# Patient Record
Sex: MALE | Race: WHITE | Employment: OTHER | URBAN - METROPOLITAN AREA
[De-identification: names, ages, dates, MRNs, and addresses within clinical notes are randomized per-mention and may not be internally consistent; named-entity substitution may affect disease eponyms.]

---

## 2019-04-29 ENCOUNTER — APPOINTMENT (OUTPATIENT)
Dept: ULTRASOUND IMAGING | Age: 54
End: 2019-04-29
Payer: COMMERCIAL

## 2019-04-29 ENCOUNTER — APPOINTMENT (OUTPATIENT)
Dept: CT IMAGING | Age: 54
End: 2019-04-29
Payer: COMMERCIAL

## 2019-04-29 ENCOUNTER — HOSPITAL ENCOUNTER (EMERGENCY)
Age: 54
Discharge: HOME | End: 2019-04-29
Attending: EMERGENCY MEDICINE
Payer: COMMERCIAL

## 2019-04-29 VITALS
OXYGEN SATURATION: 98 % | DIASTOLIC BLOOD PRESSURE: 86 MMHG | WEIGHT: 196 LBS | TEMPERATURE: 98.3 F | HEART RATE: 78 BPM | HEIGHT: 72 IN | BODY MASS INDEX: 26.55 KG/M2 | RESPIRATION RATE: 14 BRPM | SYSTOLIC BLOOD PRESSURE: 116 MMHG

## 2019-04-29 DIAGNOSIS — K57.32 DIVERTICULITIS OF COLON: Primary | ICD-10-CM

## 2019-04-29 LAB
ANION GAP SERPL CALCULATED.3IONS-SCNC: 9 MMOL/L (ref 7–16)
BASOPHILS ABSOLUTE: 0.02 E9/L (ref 0–0.2)
BASOPHILS RELATIVE PERCENT: 0.3 % (ref 0–2)
BILIRUBIN URINE: NEGATIVE
BLOOD, URINE: NEGATIVE
BUN BLDV-MCNC: 12 MG/DL (ref 6–20)
CALCIUM SERPL-MCNC: 9.3 MG/DL (ref 8.6–10.2)
CHLORIDE BLD-SCNC: 102 MMOL/L (ref 98–107)
CLARITY: CLEAR
CO2: 29 MMOL/L (ref 22–29)
COLOR: YELLOW
CREAT SERPL-MCNC: 0.9 MG/DL (ref 0.7–1.2)
EOSINOPHILS ABSOLUTE: 0.05 E9/L (ref 0.05–0.5)
EOSINOPHILS RELATIVE PERCENT: 0.7 % (ref 0–6)
GFR AFRICAN AMERICAN: >60
GFR NON-AFRICAN AMERICAN: >60 ML/MIN/1.73
GLUCOSE BLD-MCNC: 111 MG/DL (ref 74–99)
GLUCOSE URINE: NEGATIVE MG/DL
HCT VFR BLD CALC: 47.7 % (ref 37–54)
HEMOGLOBIN: 16.3 G/DL (ref 12.5–16.5)
IMMATURE GRANULOCYTES #: 0.02 E9/L
IMMATURE GRANULOCYTES %: 0.3 % (ref 0–5)
KETONES, URINE: NEGATIVE MG/DL
LEUKOCYTE ESTERASE, URINE: NEGATIVE
LYMPHOCYTES ABSOLUTE: 0.95 E9/L (ref 1.5–4)
LYMPHOCYTES RELATIVE PERCENT: 13.1 % (ref 20–42)
MCH RBC QN AUTO: 30.9 PG (ref 26–35)
MCHC RBC AUTO-ENTMCNC: 34.2 % (ref 32–34.5)
MCV RBC AUTO: 90.3 FL (ref 80–99.9)
MONOCYTES ABSOLUTE: 0.71 E9/L (ref 0.1–0.95)
MONOCYTES RELATIVE PERCENT: 9.8 % (ref 2–12)
NEUTROPHILS ABSOLUTE: 5.51 E9/L (ref 1.8–7.3)
NEUTROPHILS RELATIVE PERCENT: 75.8 % (ref 43–80)
NITRITE, URINE: NEGATIVE
PDW BLD-RTO: 12.2 FL (ref 11.5–15)
PH UA: 6 (ref 5–9)
PLATELET # BLD: 234 E9/L (ref 130–450)
PMV BLD AUTO: 9.2 FL (ref 7–12)
POTASSIUM REFLEX MAGNESIUM: 4.4 MMOL/L (ref 3.5–5)
PROTEIN UA: NEGATIVE MG/DL
RBC # BLD: 5.28 E12/L (ref 3.8–5.8)
SODIUM BLD-SCNC: 140 MMOL/L (ref 132–146)
SPECIFIC GRAVITY UA: 1.02 (ref 1–1.03)
UROBILINOGEN, URINE: 0.2 E.U./DL
WBC # BLD: 7.3 E9/L (ref 4.5–11.5)

## 2019-04-29 PROCEDURE — 76705 ECHO EXAM OF ABDOMEN: CPT

## 2019-04-29 PROCEDURE — 74176 CT ABD & PELVIS W/O CONTRAST: CPT

## 2019-04-29 PROCEDURE — 81003 URINALYSIS AUTO W/O SCOPE: CPT

## 2019-04-29 PROCEDURE — 96374 THER/PROPH/DIAG INJ IV PUSH: CPT

## 2019-04-29 PROCEDURE — 99284 EMERGENCY DEPT VISIT MOD MDM: CPT

## 2019-04-29 PROCEDURE — 2580000003 HC RX 258: Performed by: STUDENT IN AN ORGANIZED HEALTH CARE EDUCATION/TRAINING PROGRAM

## 2019-04-29 PROCEDURE — 85025 COMPLETE CBC W/AUTO DIFF WBC: CPT

## 2019-04-29 PROCEDURE — 80048 BASIC METABOLIC PNL TOTAL CA: CPT

## 2019-04-29 PROCEDURE — 6360000002 HC RX W HCPCS: Performed by: STUDENT IN AN ORGANIZED HEALTH CARE EDUCATION/TRAINING PROGRAM

## 2019-04-29 PROCEDURE — 6370000000 HC RX 637 (ALT 250 FOR IP): Performed by: STUDENT IN AN ORGANIZED HEALTH CARE EDUCATION/TRAINING PROGRAM

## 2019-04-29 RX ORDER — 0.9 % SODIUM CHLORIDE 0.9 %
1000 INTRAVENOUS SOLUTION INTRAVENOUS ONCE
Status: COMPLETED | OUTPATIENT
Start: 2019-04-29 | End: 2019-04-29

## 2019-04-29 RX ORDER — AMOXICILLIN AND CLAVULANATE POTASSIUM 875; 125 MG/1; MG/1
1 TABLET, FILM COATED ORAL 3 TIMES DAILY
Qty: 21 TABLET | Refills: 0 | Status: SHIPPED | OUTPATIENT
Start: 2019-04-29 | End: 2019-05-06

## 2019-04-29 RX ORDER — KETOROLAC TROMETHAMINE 30 MG/ML
30 INJECTION, SOLUTION INTRAMUSCULAR; INTRAVENOUS ONCE
Status: COMPLETED | OUTPATIENT
Start: 2019-04-29 | End: 2019-04-29

## 2019-04-29 RX ORDER — AMOXICILLIN AND CLAVULANATE POTASSIUM 875; 125 MG/1; MG/1
1 TABLET, FILM COATED ORAL ONCE
Status: COMPLETED | OUTPATIENT
Start: 2019-04-29 | End: 2019-04-29

## 2019-04-29 RX ADMIN — SODIUM CHLORIDE 1000 ML: 9 INJECTION, SOLUTION INTRAVENOUS at 09:58

## 2019-04-29 RX ADMIN — KETOROLAC TROMETHAMINE 30 MG: 30 INJECTION, SOLUTION INTRAMUSCULAR at 09:58

## 2019-04-29 RX ADMIN — AMOXICILLIN AND CLAVULANATE POTASSIUM 1 TABLET: 875; 125 TABLET, FILM COATED ORAL at 11:52

## 2019-04-29 ASSESSMENT — ENCOUNTER SYMPTOMS
DIARRHEA: 0
EYE REDNESS: 0
EYE DISCHARGE: 0
VOMITING: 0
NAUSEA: 0
ABDOMINAL PAIN: 1
SINUS PRESSURE: 0
SORE THROAT: 0
COUGH: 0
EYE PAIN: 0
SHORTNESS OF BREATH: 0
WHEEZING: 0
BACK PAIN: 0

## 2019-04-29 ASSESSMENT — PAIN SCALES - GENERAL
PAINLEVEL_OUTOF10: 2
PAINLEVEL_OUTOF10: 4

## 2019-04-29 ASSESSMENT — PAIN DESCRIPTION - LOCATION: LOCATION: FLANK

## 2019-04-29 ASSESSMENT — PAIN DESCRIPTION - PROGRESSION: CLINICAL_PROGRESSION: NOT CHANGED

## 2019-04-29 ASSESSMENT — PAIN DESCRIPTION - ORIENTATION: ORIENTATION: RIGHT

## 2019-04-29 ASSESSMENT — PAIN DESCRIPTION - DESCRIPTORS: DESCRIPTORS: PATIENT UNABLE TO DESCRIBE

## 2019-04-29 ASSESSMENT — PAIN DESCRIPTION - PAIN TYPE: TYPE: ACUTE PAIN

## 2019-04-29 ASSESSMENT — PAIN DESCRIPTION - FREQUENCY: FREQUENCY: CONTINUOUS

## 2019-04-29 NOTE — ED PROVIDER NOTES
Patient is a 47 yr old male presenting with RUQ pain which began last Friday. He states that he fell on his butt on Thursday but was not in any pain that day or the next until he eventually developed a constant pain in his RUQ. He states he has had a stone before but that was 20 years ago and he remembers the pain being a lot worse. He denies any fevers, nausea or vomiting. The history is provided by the patient. Abdominal Pain   Pain location:  RUQ  Pain quality: aching and cramping    Pain radiates to:  Does not radiate  Pain severity:  Mild  Onset quality:  Sudden  Duration:  4 days  Timing:  Constant  Progression:  Unchanged  Chronicity:  New  Ineffective treatments:  Acetaminophen and NSAIDs  Associated symptoms: no chest pain, no chills, no cough, no diarrhea, no dysuria, no fever, no nausea, no shortness of breath, no sore throat and no vomiting        Review of Systems   Constitutional: Negative for chills and fever. HENT: Negative for ear pain, sinus pressure and sore throat. Eyes: Negative for pain, discharge and redness. Respiratory: Negative for cough, shortness of breath and wheezing. Cardiovascular: Negative for chest pain. Gastrointestinal: Positive for abdominal pain. Negative for diarrhea, nausea and vomiting. Genitourinary: Negative for dysuria and frequency. Musculoskeletal: Negative for arthralgias and back pain. Skin: Negative for rash and wound. Neurological: Negative for weakness and headaches. Hematological: Negative for adenopathy. All other systems reviewed and are negative. Physical Exam   Constitutional: He is oriented to person, place, and time. He appears well-developed and well-nourished. HENT:   Head: Normocephalic and atraumatic. Eyes: Pupils are equal, round, and reactive to light. Neck: Normal range of motion. Neck supple. Cardiovascular: Normal rate, regular rhythm and normal heart sounds.    Pulmonary/Chest: Effort normal and breath sounds normal. No respiratory distress. He has no wheezes. He has no rales. Abdominal: Soft. Bowel sounds are normal. There is tenderness (Mild TTP RUQ). There is no rebound and no guarding. Musculoskeletal: He exhibits no edema. Neurological: He is alert and oriented to person, place, and time. No cranial nerve deficit. Coordination normal.   Skin: Skin is warm and dry. Nursing note and vitals reviewed. Procedures    MDM  Number of Diagnoses or Management Options  Diagnosis management comments: Patient is a 47 yr old male with 4 days of RUQ pain. He denies nausea/vomiting or fevers at home. He has no urinary complaints aside from his urine looking a little darker than normal. He has had a stone before but he said the pain was much worse at that time. Potential is there for Cholelithiasis/Cholecystitis vs stone so we will treat his pain with Toradol, get pertinent labs and an US of RUQ. ED Course as of Apr 29 1151 Mon Apr 29, 2019   8886 Right upper quadrant abdominal pain  since Friday of last week. Patient states he has irritable bowel syndrome so does not eat unhealthy or fatty foods since this seen no association with the type of food he consumes. Pain is a constant aching sensation with waxing and waning. [RM]   1108 Ultrasound was normal. His complaints were fairly non-specific in that general area so will follow up with CT.     [AL]   1150 Findings suggestive of diverticulitis on CT. Will give him a dose of Augmentin here and have him continue at home. Dr. Pascual Park to update the patient prior to discharge. [AL]      ED Course User Index  [AL] Jesus Jiménez DO  [RM] Rubi Alex DO          ED Course as of Apr 29 1151 Mon Apr 29, 2019   7906 Right upper quadrant abdominal pain  since Friday of last week. Patient states he has irritable bowel syndrome so does not eat unhealthy or fatty foods since this seen no association with the type of food he consumes.  Pain is a constant aching sensation with waxing and waning. [RM]   1108 Ultrasound was normal. His complaints were fairly non-specific in that general area so will follow up with CT.     [AL]   1150 Findings suggestive of diverticulitis on CT. Will give him a dose of Augmentin here and have him continue at home. Dr. Ama Palacios to update the patient prior to discharge. [AL]      ED Course User Index  [AL] Judah Woodward DO  [RM] Vahid Wall DO       --------------------------------------------- PAST HISTORY ---------------------------------------------  Past Medical History:  has a past medical history of Bilateral inguinal hernia. Past Surgical History:  has a past surgical history that includes Lithotripsy; Elbow surgery (Left, 1982); and hernia repair (02/24/2017). Social History:  reports that he has never smoked. He has never used smokeless tobacco. He reports that he drinks alcohol. He reports that he does not use drugs. Family History: family history is not on file. The patients home medications have been reviewed. Allergies: Patient has no known allergies.     -------------------------------------------------- RESULTS -------------------------------------------------  Labs:  Results for orders placed or performed during the hospital encounter of 85/94/16   Basic Metabolic Panel w/ Reflex to MG   Result Value Ref Range    Sodium 140 132 - 146 mmol/L    Potassium reflex Magnesium 4.4 3.5 - 5.0 mmol/L    Chloride 102 98 - 107 mmol/L    CO2 29 22 - 29 mmol/L    Anion Gap 9 7 - 16 mmol/L    Glucose 111 (H) 74 - 99 mg/dL    BUN 12 6 - 20 mg/dL    CREATININE 0.9 0.7 - 1.2 mg/dL    GFR Non-African American >60 >=60 mL/min/1.73    GFR African American >60     Calcium 9.3 8.6 - 10.2 mg/dL   CBC Auto Differential   Result Value Ref Range    WBC 7.3 4.5 - 11.5 E9/L    RBC 5.28 3.80 - 5.80 E12/L    Hemoglobin 16.3 12.5 - 16.5 g/dL    Hematocrit 47.7 37.0 - 54.0 %    MCV 90.3 80.0 - 99.9 fL    MCH 30.9 26.0 - 35.0 pg MCHC 34.2 32.0 - 34.5 %    RDW 12.2 11.5 - 15.0 fL    Platelets 792 698 - 811 E9/L    MPV 9.2 7.0 - 12.0 fL    Neutrophils % 75.8 43.0 - 80.0 %    Immature Granulocytes % 0.3 0.0 - 5.0 %    Lymphocytes % 13.1 (L) 20.0 - 42.0 %    Monocytes % 9.8 2.0 - 12.0 %    Eosinophils % 0.7 0.0 - 6.0 %    Basophils % 0.3 0.0 - 2.0 %    Neutrophils # 5.51 1.80 - 7.30 E9/L    Immature Granulocytes # 0.02 E9/L    Lymphocytes # 0.95 (L) 1.50 - 4.00 E9/L    Monocytes # 0.71 0.10 - 0.95 E9/L    Eosinophils # 0.05 0.05 - 0.50 E9/L    Basophils # 0.02 0.00 - 0.20 E9/L   Urinalysis, reflex to microscopic   Result Value Ref Range    Color, UA Yellow Straw/Yellow    Clarity, UA Clear Clear    Glucose, Ur Negative Negative mg/dL    Bilirubin Urine Negative Negative    Ketones, Urine Negative Negative mg/dL    Specific Gravity, UA 1.025 1.005 - 1.030    Blood, Urine Negative Negative    pH, UA 6.0 5.0 - 9.0    Protein, UA Negative Negative mg/dL    Urobilinogen, Urine 0.2 <2.0 E.U./dL    Nitrite, Urine Negative Negative    Leukocyte Esterase, Urine Negative Negative       Radiology:  CT ABDOMEN PELVIS WO CONTRAST Additional Contrast? None   Final Result   Nonspecific infiltration in the mesenteric fat right laterally in the   mid abdomen. This is contiguous with some loops of colon and may be   related to acute diverticulitis however no definite diverticula are   noted. Findings are nonspecific. THIS IS AN ABNORMAL REPORT      US GALLBLADDER RUQ   Final Result   NORMAL GALLBLADDER ULTRASOUND.              ------------------------- NURSING NOTES AND VITALS REVIEWED ---------------------------  Date / Time Roomed:  4/29/2019  9:28 AM  ED Bed Assignment:  26/26    The nursing notes within the ED encounter and vital signs as below have been reviewed.    /86   Pulse 78   Temp 98.3 °F (36.8 °C) (Oral)   Resp 14   Ht 6' (1.829 m)   Wt 196 lb (88.9 kg)   SpO2 98%   BMI 26.58 kg/m²   Oxygen Saturation Interpretation: Normal      ------------------------------------------ PROGRESS NOTES ------------------------------------------  11:51 AM  I have spoken with the patient and discussed todays results, in addition to providing specific details for the plan of care and counseling regarding the diagnosis and prognosis. Their questions are answered at this time and they are agreeable with the plan. I discussed at length with them reasons for immediate return here for re evaluation. They will followup with their primary care physician by calling their office tomorrow. --------------------------------- ADDITIONAL PROVIDER NOTES ---------------------------------  At this time the patient is without objective evidence of an acute process requiring hospitalization or inpatient management. They have remained hemodynamically stable throughout their entire ED visit and are stable for discharge with outpatient follow-up. The plan has been discussed in detail and they are aware of the specific conditions for emergent return, as well as the importance of follow-up. New Prescriptions    AMOXICILLIN-CLAVULANATE (AUGMENTIN) 875-125 MG PER TABLET    Take 1 tablet by mouth 3 times daily for 7 days       Diagnosis:  1. Diverticulitis of colon        Disposition:  Patient's disposition: Discharge to home  Patient's condition is stable.        Balaji Villanueva,   Resident  04/29/19 [id number]

## 2023-11-24 ENCOUNTER — OFFICE VISIT (OUTPATIENT)
Dept: FAMILY MEDICINE CLINIC | Age: 58
End: 2023-11-24
Payer: COMMERCIAL

## 2023-11-24 VITALS
RESPIRATION RATE: 19 BRPM | TEMPERATURE: 97.9 F | SYSTOLIC BLOOD PRESSURE: 130 MMHG | WEIGHT: 200.4 LBS | OXYGEN SATURATION: 96 % | DIASTOLIC BLOOD PRESSURE: 74 MMHG | HEIGHT: 72 IN | HEART RATE: 77 BPM | BODY MASS INDEX: 27.14 KG/M2

## 2023-11-24 DIAGNOSIS — M79.652 LEFT THIGH PAIN: ICD-10-CM
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
## 2023-11-24 DIAGNOSIS — S70.362A TICK BITE OF LEFT THIGH, INITIAL ENCOUNTER: Primary | ICD-10-CM

## 2023-11-24 PROCEDURE — 99213 OFFICE O/P EST LOW 20 MIN: CPT | Performed by: EMERGENCY MEDICINE

## 2023-11-24 RX ORDER — DOXYCYCLINE HYCLATE 100 MG
200 TABLET ORAL DAILY
Qty: 2 TABLET | Refills: 0 | Status: SHIPPED | OUTPATIENT
Start: 2023-11-24

## 2023-11-24 SDOH — ECONOMIC STABILITY: FOOD INSECURITY: WITHIN THE PAST 12 MONTHS, THE FOOD YOU BOUGHT JUST DIDN'T LAST AND YOU DIDN'T HAVE MONEY TO GET MORE.: NEVER TRUE

## 2023-11-24 SDOH — ECONOMIC STABILITY: FOOD INSECURITY: WITHIN THE PAST 12 MONTHS, YOU WORRIED THAT YOUR FOOD WOULD RUN OUT BEFORE YOU GOT MONEY TO BUY MORE.: NEVER TRUE

## 2023-11-24 SDOH — ECONOMIC STABILITY: HOUSING INSECURITY
IN THE LAST 12 MONTHS, WAS THERE A TIME WHEN YOU DID NOT HAVE A STEADY PLACE TO SLEEP OR SLEPT IN A SHELTER (INCLUDING NOW)?: NO

## 2023-11-24 SDOH — ECONOMIC STABILITY: INCOME INSECURITY: HOW HARD IS IT FOR YOU TO PAY FOR THE VERY BASICS LIKE FOOD, HOUSING, MEDICAL CARE, AND HEATING?: NOT HARD AT ALL

## 2023-11-24 ASSESSMENT — ENCOUNTER SYMPTOMS
COUGH: 0
DIARRHEA: 0
SHORTNESS OF BREATH: 0
WHEEZING: 0
EYE DISCHARGE: 0
SORE THROAT: 0
EYE REDNESS: 0
ABDOMINAL PAIN: 0
EYE PAIN: 0
VOMITING: 0
BACK PAIN: 0
SINUS PRESSURE: 0
NAUSEA: 0

## 2023-11-24 ASSESSMENT — PATIENT HEALTH QUESTIONNAIRE - PHQ9: DEPRESSION UNABLE TO ASSESS: PT REFUSES

## 2025-04-22 ENCOUNTER — OFFICE VISIT (OUTPATIENT)
Dept: FAMILY MEDICINE CLINIC | Age: 60
End: 2025-04-22
Payer: COMMERCIAL

## 2025-04-22 VITALS
RESPIRATION RATE: 18 BRPM | BODY MASS INDEX: 27.5 KG/M2 | OXYGEN SATURATION: 98 % | TEMPERATURE: 98.4 F | SYSTOLIC BLOOD PRESSURE: 124 MMHG | DIASTOLIC BLOOD PRESSURE: 74 MMHG | HEART RATE: 72 BPM | WEIGHT: 203 LBS | HEIGHT: 72 IN

## 2025-04-22 DIAGNOSIS — S20.362A TICK BITE OF LEFT FRONT WALL OF THORAX, INITIAL ENCOUNTER: Primary | ICD-10-CM
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
## 2025-04-22 PROCEDURE — 99213 OFFICE O/P EST LOW 20 MIN: CPT | Performed by: FAMILY MEDICINE

## 2025-04-22 RX ORDER — DOXYCYCLINE HYCLATE 100 MG
TABLET ORAL
Qty: 2 TABLET | Refills: 1 | Status: SHIPPED | OUTPATIENT
Start: 2025-04-22

## 2025-04-22 ASSESSMENT — ENCOUNTER SYMPTOMS
EYE PAIN: 0
SORE THROAT: 0
BLOOD IN STOOL: 0
SINUS PAIN: 0
EYE REDNESS: 0
CHEST TIGHTNESS: 0
BACK PAIN: 0
SHORTNESS OF BREATH: 0
EYE DISCHARGE: 0
VOMITING: 0
TROUBLE SWALLOWING: 0
ALLERGIC/IMMUNOLOGIC NEGATIVE: 1
COUGH: 0
ROS SKIN COMMENTS: TICK BITE
PHOTOPHOBIA: 0
NAUSEA: 0
ABDOMINAL PAIN: 0
DIARRHEA: 0

## 2025-04-22 NOTE — PROGRESS NOTES
25  Godwin Werner : 1965 Sex: male  Age: 59 y.o.      Assessment and Plan:  Godwin was seen today for insect bite.    Diagnoses and all orders for this visit:    Tick bite of left front wall of thorax, initial encounter  -     doxycycline hyclate (VIBRA-TABS) 100 MG tablet; Take both tablets at once    Will treat with single dose of 200 mg doxycycline for Lyme prevention.  Keep the area clean and dry with Neosporin or Polysporin.  If complaints do not improve, or worsen in any way, present back to the office.    Return 3 to 5-day recheck if not improved.    Chief Complaint   Patient presents with    Insect Bite       Patient complains of tick bite to the anterior chest.  He has dogs that are outside all the time and [place] also [place].  He noticed a tick on his back 2 weeks ago which was removed uneventfully and then a tick on his anterior chest wall yesterday which she removed.  He denies satellite lesions, fever, chills, arthralgias.        Review of Systems   Constitutional:  Negative for appetite change, fatigue and unexpected weight change.   HENT:  Negative for congestion, ear pain, hearing loss, sinus pain, sore throat and trouble swallowing.    Eyes:  Negative for photophobia, pain, discharge and redness.   Respiratory:  Negative for cough, chest tightness and shortness of breath.    Cardiovascular:  Negative for chest pain, palpitations and leg swelling.   Gastrointestinal:  Negative for abdominal pain, blood in stool, diarrhea, nausea and vomiting.   Endocrine: Negative.    Genitourinary:  Negative for dysuria, flank pain, frequency and hematuria.   Musculoskeletal:  Negative for arthralgias, back pain, joint swelling and myalgias.   Skin:  Positive for wound.        Tick bite   Allergic/Immunologic: Negative.    Neurological:  Negative for dizziness, seizures, syncope, weakness, light-headedness, numbness and headaches.   Hematological:  Negative for adenopathy. Does not